# Patient Record
Sex: MALE | Race: WHITE | NOT HISPANIC OR LATINO | URBAN - METROPOLITAN AREA
[De-identification: names, ages, dates, MRNs, and addresses within clinical notes are randomized per-mention and may not be internally consistent; named-entity substitution may affect disease eponyms.]

---

## 2017-02-03 ENCOUNTER — COMMUNICATION - HEALTHEAST (OUTPATIENT)
Dept: INTENSIVE CARE | Facility: CLINIC | Age: 52
End: 2017-02-03

## 2017-02-03 DIAGNOSIS — I48.91 A-FIB (H): ICD-10-CM

## 2017-03-08 ENCOUNTER — COMMUNICATION - HEALTHEAST (OUTPATIENT)
Dept: ADMINISTRATIVE | Facility: CLINIC | Age: 52
End: 2017-03-08

## 2017-04-19 ENCOUNTER — OFFICE VISIT - HEALTHEAST (OUTPATIENT)
Dept: CARDIOLOGY | Facility: CLINIC | Age: 52
End: 2017-04-19

## 2017-04-19 DIAGNOSIS — I10 ESSENTIAL HYPERTENSION WITH GOAL BLOOD PRESSURE LESS THAN 130/80: ICD-10-CM

## 2017-04-19 DIAGNOSIS — Z86.79 STATUS POST ABLATION OF ATRIAL FIBRILLATION: ICD-10-CM

## 2017-04-19 DIAGNOSIS — I48.0 PAROXYSMAL ATRIAL FIBRILLATION (H): ICD-10-CM

## 2017-04-19 DIAGNOSIS — Z98.890 STATUS POST ABLATION OF ATRIAL FIBRILLATION: ICD-10-CM

## 2017-04-19 ASSESSMENT — MIFFLIN-ST. JEOR: SCORE: 2032.62

## 2017-08-04 ENCOUNTER — COMMUNICATION - HEALTHEAST (OUTPATIENT)
Dept: INTENSIVE CARE | Facility: CLINIC | Age: 52
End: 2017-08-04

## 2017-08-04 DIAGNOSIS — I48.91 A-FIB (H): ICD-10-CM

## 2017-08-06 ENCOUNTER — COMMUNICATION - HEALTHEAST (OUTPATIENT)
Dept: CARDIOLOGY | Facility: CLINIC | Age: 52
End: 2017-08-06

## 2017-08-06 DIAGNOSIS — I25.10 CAD (CORONARY ARTERY DISEASE): ICD-10-CM

## 2017-09-21 ENCOUNTER — RECORDS - HEALTHEAST (OUTPATIENT)
Dept: LAB | Facility: CLINIC | Age: 52
End: 2017-09-21

## 2017-09-21 LAB
CHOLEST SERPL-MCNC: 208 MG/DL
FASTING STATUS PATIENT QL REPORTED: ABNORMAL
HDLC SERPL-MCNC: 49 MG/DL
LDLC SERPL CALC-MCNC: 115 MG/DL
PSA SERPL-MCNC: 2 NG/ML (ref 0–3.5)
TRIGL SERPL-MCNC: 220 MG/DL

## 2018-01-28 ENCOUNTER — COMMUNICATION - HEALTHEAST (OUTPATIENT)
Dept: INTENSIVE CARE | Facility: CLINIC | Age: 53
End: 2018-01-28

## 2018-01-28 ENCOUNTER — COMMUNICATION - HEALTHEAST (OUTPATIENT)
Dept: CARDIOLOGY | Facility: CLINIC | Age: 53
End: 2018-01-28

## 2018-01-28 DIAGNOSIS — I48.91 A-FIB (H): ICD-10-CM

## 2018-01-28 DIAGNOSIS — I25.10 CAD (CORONARY ARTERY DISEASE): ICD-10-CM

## 2018-05-15 ENCOUNTER — RECORDS - HEALTHEAST (OUTPATIENT)
Dept: ADMINISTRATIVE | Facility: OTHER | Age: 53
End: 2018-05-15

## 2018-06-11 ENCOUNTER — RECORDS - HEALTHEAST (OUTPATIENT)
Dept: ADMINISTRATIVE | Facility: OTHER | Age: 53
End: 2018-06-11

## 2018-06-13 ENCOUNTER — OFFICE VISIT - HEALTHEAST (OUTPATIENT)
Dept: CARDIOLOGY | Facility: CLINIC | Age: 53
End: 2018-06-13

## 2018-06-13 DIAGNOSIS — I10 ESSENTIAL HYPERTENSION: ICD-10-CM

## 2018-06-13 DIAGNOSIS — Z86.79 STATUS POST ABLATION OF ATRIAL FIBRILLATION: ICD-10-CM

## 2018-06-13 DIAGNOSIS — Z98.890 STATUS POST ABLATION OF ATRIAL FIBRILLATION: ICD-10-CM

## 2018-06-13 DIAGNOSIS — I48.0 PAROXYSMAL ATRIAL FIBRILLATION (H): ICD-10-CM

## 2018-06-13 ASSESSMENT — MIFFLIN-ST. JEOR: SCORE: 2014.93

## 2019-04-17 ENCOUNTER — RECORDS - HEALTHEAST (OUTPATIENT)
Dept: LAB | Facility: CLINIC | Age: 54
End: 2019-04-17

## 2019-04-17 LAB
ANION GAP SERPL CALCULATED.3IONS-SCNC: 8 MMOL/L (ref 5–18)
BUN SERPL-MCNC: 16 MG/DL (ref 8–22)
CALCIUM SERPL-MCNC: 9.8 MG/DL (ref 8.5–10.5)
CHLORIDE BLD-SCNC: 105 MMOL/L (ref 98–107)
CHOLEST SERPL-MCNC: 218 MG/DL
CO2 SERPL-SCNC: 25 MMOL/L (ref 22–31)
CREAT SERPL-MCNC: 0.98 MG/DL (ref 0.7–1.3)
FASTING STATUS PATIENT QL REPORTED: ABNORMAL
GFR SERPL CREATININE-BSD FRML MDRD: >60 ML/MIN/1.73M2
GLUCOSE BLD-MCNC: 98 MG/DL (ref 70–125)
HDLC SERPL-MCNC: 50 MG/DL
LDLC SERPL CALC-MCNC: 146 MG/DL
POTASSIUM BLD-SCNC: 4.6 MMOL/L (ref 3.5–5)
PSA SERPL-MCNC: 1.7 NG/ML (ref 0–3.5)
SODIUM SERPL-SCNC: 138 MMOL/L (ref 136–145)
TRIGL SERPL-MCNC: 108 MG/DL

## 2019-07-14 ENCOUNTER — COMMUNICATION - HEALTHEAST (OUTPATIENT)
Dept: CARDIOLOGY | Facility: CLINIC | Age: 54
End: 2019-07-14

## 2019-07-14 DIAGNOSIS — I10 ESSENTIAL HYPERTENSION: ICD-10-CM

## 2019-07-14 DIAGNOSIS — I48.0 PAROXYSMAL ATRIAL FIBRILLATION (H): ICD-10-CM

## 2019-08-23 ENCOUNTER — OFFICE VISIT - HEALTHEAST (OUTPATIENT)
Dept: CARDIOLOGY | Facility: CLINIC | Age: 54
End: 2019-08-23

## 2019-08-23 DIAGNOSIS — Z98.890 STATUS POST ABLATION OF ATRIAL FIBRILLATION: ICD-10-CM

## 2019-08-23 DIAGNOSIS — I10 ESSENTIAL HYPERTENSION: ICD-10-CM

## 2019-08-23 DIAGNOSIS — Z86.79 STATUS POST ABLATION OF ATRIAL FIBRILLATION: ICD-10-CM

## 2019-08-23 DIAGNOSIS — I48.0 PAROXYSMAL ATRIAL FIBRILLATION (H): ICD-10-CM

## 2019-08-23 ASSESSMENT — MIFFLIN-ST. JEOR: SCORE: 2005.86

## 2020-04-06 ENCOUNTER — COMMUNICATION - HEALTHEAST (OUTPATIENT)
Dept: CARDIOLOGY | Facility: CLINIC | Age: 55
End: 2020-04-06

## 2020-06-04 ENCOUNTER — RECORDS - HEALTHEAST (OUTPATIENT)
Dept: LAB | Facility: CLINIC | Age: 55
End: 2020-06-04

## 2020-06-09 LAB — B BURGDOR IGG+IGM SER QL: 0.2 INDEX VALUE

## 2020-06-10 LAB
B MICROTI DNA BLD QL NAA+PROBE: NOT DETECTED
BABESIA DNA BLD QL NAA+PROBE: NOT DETECTED

## 2020-06-11 LAB
A PHAGOCYTOPH DNA BLD QL NAA+PROBE: NOT DETECTED
E CHAFFEENSIS DNA BLD QL NAA+PROBE: NOT DETECTED
E EWINGII DNA SPEC QL NAA+PROBE: NOT DETECTED
EHRLICHIA DNA SPEC QL NAA+PROBE: NOT DETECTED

## 2020-09-01 ENCOUNTER — RECORDS - HEALTHEAST (OUTPATIENT)
Dept: LAB | Facility: CLINIC | Age: 55
End: 2020-09-01

## 2020-09-01 LAB
ALBUMIN SERPL-MCNC: 4.2 G/DL (ref 3.5–5)
ALP SERPL-CCNC: 70 U/L (ref 45–120)
ALT SERPL W P-5'-P-CCNC: 20 U/L (ref 0–45)
ANION GAP SERPL CALCULATED.3IONS-SCNC: 9 MMOL/L (ref 5–18)
AST SERPL W P-5'-P-CCNC: 14 U/L (ref 0–40)
BILIRUB SERPL-MCNC: 0.6 MG/DL (ref 0–1)
BUN SERPL-MCNC: 9 MG/DL (ref 8–22)
CALCIUM SERPL-MCNC: 9.2 MG/DL (ref 8.5–10.5)
CHLORIDE BLD-SCNC: 104 MMOL/L (ref 98–107)
CHOLEST SERPL-MCNC: 211 MG/DL
CO2 SERPL-SCNC: 26 MMOL/L (ref 22–31)
CREAT SERPL-MCNC: 0.88 MG/DL (ref 0.7–1.3)
FASTING STATUS PATIENT QL REPORTED: ABNORMAL
GFR SERPL CREATININE-BSD FRML MDRD: >60 ML/MIN/1.73M2
GLUCOSE BLD-MCNC: 97 MG/DL (ref 70–125)
HDLC SERPL-MCNC: 50 MG/DL
LDLC SERPL CALC-MCNC: 84 MG/DL
POTASSIUM BLD-SCNC: 4.6 MMOL/L (ref 3.5–5)
PROT SERPL-MCNC: 7.1 G/DL (ref 6–8)
PSA SERPL-MCNC: 1.7 NG/ML (ref 0–3.5)
SODIUM SERPL-SCNC: 139 MMOL/L (ref 136–145)
TRIGL SERPL-MCNC: 386 MG/DL

## 2020-09-24 ENCOUNTER — COMMUNICATION - HEALTHEAST (OUTPATIENT)
Dept: CARDIOLOGY | Facility: CLINIC | Age: 55
End: 2020-09-24

## 2020-09-24 ENCOUNTER — RECORDS - HEALTHEAST (OUTPATIENT)
Dept: ADMINISTRATIVE | Facility: OTHER | Age: 55
End: 2020-09-24

## 2020-09-24 ENCOUNTER — AMBULATORY - HEALTHEAST (OUTPATIENT)
Dept: CARDIOLOGY | Facility: CLINIC | Age: 55
End: 2020-09-24

## 2020-09-24 DIAGNOSIS — I48.0 PAROXYSMAL ATRIAL FIBRILLATION (H): ICD-10-CM

## 2020-09-24 DIAGNOSIS — I10 ESSENTIAL HYPERTENSION: ICD-10-CM

## 2020-09-29 ENCOUNTER — OFFICE VISIT - HEALTHEAST (OUTPATIENT)
Dept: CARDIOLOGY | Facility: CLINIC | Age: 55
End: 2020-09-29

## 2020-09-29 DIAGNOSIS — I10 ESSENTIAL HYPERTENSION: ICD-10-CM

## 2020-09-29 DIAGNOSIS — Z86.79 STATUS POST ABLATION OF ATRIAL FIBRILLATION: ICD-10-CM

## 2020-09-29 DIAGNOSIS — I48.0 PAROXYSMAL ATRIAL FIBRILLATION (H): ICD-10-CM

## 2020-09-29 DIAGNOSIS — Z98.890 STATUS POST ABLATION OF ATRIAL FIBRILLATION: ICD-10-CM

## 2020-09-29 ASSESSMENT — MIFFLIN-ST. JEOR: SCORE: 1996.07

## 2020-12-22 ENCOUNTER — COMMUNICATION - HEALTHEAST (OUTPATIENT)
Dept: CARDIOLOGY | Facility: CLINIC | Age: 55
End: 2020-12-22

## 2020-12-22 DIAGNOSIS — I48.0 PAROXYSMAL ATRIAL FIBRILLATION (H): ICD-10-CM

## 2020-12-22 DIAGNOSIS — I10 ESSENTIAL HYPERTENSION: ICD-10-CM

## 2020-12-22 RX ORDER — LISINOPRIL 5 MG/1
TABLET ORAL
Qty: 90 TABLET | Refills: 3 | Status: SHIPPED | OUTPATIENT
Start: 2020-12-22 | End: 2022-01-24

## 2020-12-22 RX ORDER — METOPROLOL SUCCINATE 50 MG/1
TABLET, EXTENDED RELEASE ORAL
Qty: 90 TABLET | Refills: 3 | Status: SHIPPED | OUTPATIENT
Start: 2020-12-22 | End: 2022-01-24

## 2021-05-30 VITALS — HEIGHT: 71 IN | BODY MASS INDEX: 36.52 KG/M2 | WEIGHT: 260.9 LBS

## 2021-05-31 ENCOUNTER — RECORDS - HEALTHEAST (OUTPATIENT)
Dept: ADMINISTRATIVE | Facility: CLINIC | Age: 56
End: 2021-05-31

## 2021-05-31 NOTE — PATIENT INSTRUCTIONS - HE
Jose Antonio Arce,    It was a pleasure to see you today at the [facility].     My recommendations after this visit include:    Continue current medications    Follow up in 1 year, or sooner if needed    Mariama Rico, [facility], Electrophysiology  [phone]  EP nurses [phone]

## 2021-06-01 VITALS — BODY MASS INDEX: 35.98 KG/M2 | HEIGHT: 71 IN | WEIGHT: 257 LBS

## 2021-06-03 VITALS — BODY MASS INDEX: 35.7 KG/M2 | HEIGHT: 71 IN | WEIGHT: 255 LBS

## 2021-06-04 VITALS
BODY MASS INDEX: 35.39 KG/M2 | WEIGHT: 252.8 LBS | RESPIRATION RATE: 12 BRPM | SYSTOLIC BLOOD PRESSURE: 132 MMHG | DIASTOLIC BLOOD PRESSURE: 80 MMHG | HEIGHT: 71 IN | HEART RATE: 68 BPM

## 2021-06-07 NOTE — TELEPHONE ENCOUNTER
Left detailed voicemail with Chloes recommendations.  Left our contact information for questions/concerns.  JW

## 2021-06-07 NOTE — TELEPHONE ENCOUNTER
Likely stress related increase in PACs.  Stay well hydrated, limit caffeine and alcohol.  Call if symptoms worsen or he has recurrence of A fib.  Thanks,  Mariama

## 2021-06-07 NOTE — TELEPHONE ENCOUNTER
Mariama,     Patient was last seen in August of last year for afib with no recurrence.     He is calling today with symptoms of increase palpitations, describes sensation of 4 skipping beats an hour.    Denies atrial fibrillation.    He feels he is well hydrated but admits to extra stress at his job.     Describes no changes to medications and no illness.     These palpitations are tolerable but cause some mild shortness of breath and chest heaviness and he noticed them getting worse last Thursday.    Recommendations?  Thanks  Ria

## 2021-06-07 NOTE — TELEPHONE ENCOUNTER
----- Message from Thi Duval sent at 4/6/2020  8:15 AM CDT -----  General phone call:    Caller: Patient  Primary cardiologist: Mariama  Detailed reason for call: Patient has been experiencing a lot of more palps than normal for him   New or active symptoms? Palps  Best phone number: [phone]  Best time to contact: Anytime  Ok to leave a detailed message? Yes  Device? no    Additional Info:

## 2021-06-10 NOTE — PROGRESS NOTES
[facility]    Assessment/Plan:      Problem List Items Addressed This Visit     Essential hypertension - Primary    Paroxysmal Atrial Fibrillation    Status post ablation of atrial fibrillation        1.  Symptomatic Paroxysmal atrial fibrillation: He has had no symptomatology or evidence recurrence of arrhythmia off antiarrhythmic therapy.  His occasional palpitations are consistent with PACs and ectopic atrial tachycardia, both of which a known history.  We reviewed avoidance of possible triggers.  Continue metoprolol succinate 50 mg daily.  He has a [id number] score of 1 for hypertension; continue aspirin 81 mg daily.     2.  Hypertension: Blood pressure at target today.    Follow-up with Dr. Rose in 1 year.    Subjective:      Jose Antonio Arce, a very pleasant 51-year-old gentleman, comes in today accompanied by his wife for follow-up of atrial fibrillation.  He was initially diagnosed with atrial fibrillation in July 2013 and underwent KIERA guided cardioversion.  He was maintained on aspirin and metoprolol and had no symptomatology of recurrence until April 2015 and underwent early cardioversion on 4/14/15 after which he was started on Eliquis 5 mg twice a day.  Symptoms consist of tachycardia palpitations, chest tightness, lightheadedness, and dyspnea on exertion.  He underwent pulmonary vein isolation ablation on 10/27/15 by Dr. Rose with cryo-to all 3 pulmonary veins (he has a left common pulmonary vein).   He had a few very brief episodes of atrial fibrillation about a month after ablation, one of which was documented on an event monitor.  He had no further recurrence so his Eliquis was discontinued and he was restarted on aspirin 81 mg daily.    He has been feeling very well.  He continues to have occasional skips, but no sustained arrhythmia.  He denies fatigue, chest discomfort, abdominal bloating/fullness or peripheral edema, shortness of breath, paroxysmal nocturnal dyspnea, orthopnea,  lightheadedness, dizziness, pre-syncope, or syncope.       Medical, surgical, family, social history, and medications were reviewed and updated as necessary.  He reports no previous adverse reactions to anesthesia, or bleeding problems.    Patient Active Problem List   Diagnosis     Essential hypertension     Paroxysmal Atrial Fibrillation     Status post ablation of atrial fibrillation       Past Medical History:   Diagnosis Date     Cardiomyopathy      Chest tightness      Hypertension      Paroxysmal atrial fibrillation     s/p ablation     SOB (shortness of breath)        Past Surgical History:   Procedure Laterality Date     CARDIAC ELECTROPHYSIOLOGY MAPPING AND ABLATION  10/27/15    Cryo to all 3 PV     MANDIBLE SURGERY  1986    broken jaw     [place] CARDIOVERSION ELECTIVE ARRHYTHMIA EXTERNAL      July 2013, 4/14/15       No Known Allergies    Current Outpatient Prescriptions   Medication Sig Dispense Refill     aspirin 81 MG EC tablet Take 81 mg by mouth daily.       DOCOSAHEXANOIC ACID/EPA (FISH OIL ORAL) Take 1 capsule by mouth daily.       lisinopril (PRINIVIL,ZESTRIL) 5 MG tablet TAKE 1 TABLET BY MOUTH DAILY 90 tablet 2     metoprolol succinate (TOPROL-XL) 50 MG 24 hr tablet TAKE 1 TABLET BY MOUTH EVERY DAY 90 tablet 1     multivitamin therapeutic (THERAGRAN) tablet Take 1 tablet by mouth daily.       No current facility-administered medications for this visit.        Family History   Problem Relation Age of Onset     Gout Mother      Heart failure Mother      Ulcers Father      Hypertension Sister      Diabetes type II Sister      Hypertension Sister      Breast cancer Sister        Social History   Substance Use Topics     Smoking status: Never Smoker     Smokeless tobacco: Never Used     Alcohol use 1.2 oz/week     1 Cans of beer, 1 Shots of liquor per week       Review of Systems:   General: WNL  Eyes: WNL  Ears/Nose/Throat: WNL  Lungs: WNL  Heart:  (palpitations)  Stomach: WNL  Bladder: Frequent  "Urination at Night  Muscle/Joints: WNL  Skin: WNL  Nervous System: Daytime Sleepiness  Mental Health: WNL     Blood: Easy Bleeding      Objective:      /70 (Patient Site: Left Arm, Patient Position: Sitting, Cuff Size: Adult Large)  Pulse 72  Resp 18  Ht 5' 10.5\" (1.791 m)  Wt (!) 260 lb 14.4 oz (118.3 kg)  BMI 36.91 kg/m2    Physical Exam  General Appearance:  Alert, well-appearing, in no acute distress.     HEENT:  Atraumatic, normocephalic; no scleral icterus, EOM intact; the mucous membranes were pink and moist.   Chest:  Chest symmetric, spine straight.    Lungs:   Respirations unlabored; the lungs are clear to auscultation.   Cardiovascular:   Auscultation reveals normal first and second heart sounds with no murmurs, rubs, or gallops.  Regular rate and rhythm.  Radial and posterior tibial pulses are intact.    Abdomen:  Soft, nontender, nondistended, bowel sounds present.     Extremities: No cyanosis, clubbing, or edema.   Musculoskeletal:  Moves all extremities.     Skin: No xanthelasma.   Neurologic: Mood and affect are appropriate. Oriented to person, place, time, and situation.       Cardiographics  EC16 was personally reviewed shows normal sinus rhythm at 72 bpm    2 week event monitor worn 12/7/15 through 12/21/15 showed a single symptomatic episode of paroxysmal atrial fibrillation with elevated ventricular response at 135 bpm, occasional PACs, atrial couplets, and a short run of ectopic atrial tachycardia, all of which were noted on manual transmissions.    MR cardiac pulmonary vein done 10/5/15  Left ventricular size, thickness, function, and wall motion are all normal.  EF is 52%.  There is no evidence of myocardial scar.  Mildly enlarged left atrium.  No obvious valvular disease.  Left Common Os: 23.7 x 10.8 mm, area 1.98 sq cm.   Left Superior Pulmonary Vein: 10.9 x 12 mm, area 1.05 sq cm.   Left Inferior Pulmonary Vein: 13.9 x 13.2 mm, area 1.65 sq cm.   Right Superior Pulmonary " Vein: 28.8 x 22.1 mm, area 5.25 sq cm.   Right Inferior Pulmonary Vein: 16.7 x 16 mm, area 2.06 sq cm.   Esophagus: The esophagus is adjacent to the posterior side of left atrium body.    Echo stress exercise done 12/6/2016    Average exercise tolerance    The stress electrocardiogram is negative for inducible ischemia.    Negative stress echocardiogram    Screening Doppler shows no hemodynamically significant valvular heart abnormalities      Lab results  Lab Results   Component Value Date    CREATININE 0.86 08/17/2016    BUN 16 08/17/2016     08/17/2016    K 4.1 08/17/2016     08/17/2016    CO2 22 08/17/2016     Lab Results   Component Value Date    TSH 2.36 08/17/2016         Mariama Rico, [facility], Electrophysiology  [phone]    This note has been dictated using voice recognition software. Any grammatical, typographical, or context distortions are unintentional and inherent to the software.

## 2021-06-11 NOTE — PATIENT INSTRUCTIONS - HE
Jose Antonio Arce,    It was a pleasure to see you today at the [facility].     My recommendations after this visit include:    Continue current medications.    Follow up in 1 year.    Mariama Rico CNP  [facility], Electrophysiology  [phone]  EP nurses [phone]

## 2021-06-26 NOTE — PROGRESS NOTES
Progress Notes by Mariama Rico CNP at 6/13/2018  3:30 PM     Author: Mariama Rico CNP Service: -- Author Type: Nurse Practitioner    Filed: 6/13/2018  4:17 PM Encounter Date: 6/13/2018 Status: Signed    : Mariama Rico CNP (Nurse Practitioner)           Click to link to [facility]     [facility] HEART [facility] ELECTROPHYSIOLOGY NOTE      Assessment/Recommendations   Assessment/Plan:    1.  Paroxysmal atrial fibrillation: No symptomatology or evidence of recurrence of A. fib off antiarrhythmic therapy.  Continue metoprolol succinate 50 mg daily.  He was instructed to call if he has recurrence of A. fib.  He has a [id number] score of 1 for hypertension; continue aspirin 81 mg daily.    2.  Hypertension: Blood pressure at target today.  Continue metoprolol and lisinopril.    Follow up in 1 year.     History of Present Illness    Mr. Jose Antonio Arce is a very pleasant 53 y.o. male who comes in today for EP follow-up of atrial fibrillation.  He was diagnosed with atrial fibrillation in July 2013 for which he underwent KIERA guided cardioversion.  Symptoms consist of tachypalpitations, chest tightness, lightheadedness, and dyspnea on exertion.  He had recurrence in April 2015, undergoing early cardioversion after which he was started on Eliquis for stroke prophylaxis.  He underwent pulmonary vein isolation ablation by Dr. Rose on 10/27/2015 with cryo-to all 3 pulmonary veins (he has a left common pulmonary vein).  He had a couple of very brief episodes of A. fib the month after ablation, 1 of which was documented on an event monitor, but no subsequent recurrence.    He states that he continues to feel very well, the best he has in years.  He has been very active and states he has not even had any skips or brief palpitations.  He denies chest discomfort, abdominal fullness/bloating or peripheral edema, shortness of breath, paroxysmal nocturnal dyspnea, orthopnea, lightheadedness, dizziness, pre-syncope,  or syncope.    Cardiographics (personally reviewed):  EKG, Done 4/14/2015 shows atrial fibrillation with rapid ventricular response at 157 bpm    Stress echo done 12/6/2016:    Average exercise tolerance    The stress electrocardiogram is negative for inducible ischemia.    Negative stress echocardiogram    Screening Doppler shows no hemodynamically significant valvular heart abnormalities       Physical Examination Review of Systems   Vitals:    06/13/18 1535   BP: 132/68   Pulse: 72   Resp: 16     Body mass index is 36.35 kg/(m^2).  Wt Readings from Last 3 Encounters:   06/13/18 (!) 257 lb (116.6 kg)   04/19/17 (!) 260 lb 14.4 oz (118.3 kg)   11/29/16 (!) 260 lb (117.9 kg)     General Appearance:   Alert, well-appearing and in no acute distress.   HEENT: Atraumatic, normocephalic.  No scleral icterus, normal conjunctivae, EOM intact, PERRL; mucous membranes pink and moist.    Chest: Chest symmetric, spine straight.   Lungs:   Respirations unlabored: Lungs are clear to auscultation.   Cardiovascular:   Normal first and second heart sounds with no murmurs, rubs, or gallops.  Regular rate and rhythm.  Radial and posterior tibial pulses are intact, no edema.   Abdomen:  Nondistended, bowel sounds present   Extremities: No cyanosis or clubbing   Musculoskeletal: Moves all extremities   Skin: Warm, dry, intact.    Neurologic: Mood and affect are appropriate, alert and oriented to person, place, time, and situation    General: WNL  Eyes: WNL  Ears/Nose/Throat: WNL  Lungs: WNL  Heart: WNL  Stomach: WNL  Bladder: WNL  Muscle/Joints: WNL  Skin: WNL  Nervous System: WNL  Mental Health: WNL     Blood: WNL     Medical History  Surgical History Family History Social History   Past Medical History:   Diagnosis Date   ? Cardiomyopathy (H)    ? Chest tightness    ? Hypertension    ? Paroxysmal atrial fibrillation (H)     s/p ablation   ? SOB (shortness of breath)     Past Surgical History:   Procedure Laterality Date   ? CARDIAC  ELECTROPHYSIOLOGY MAPPING AND ABLATION  10/27/15    Cryo to all 3 PV   ? MANDIBLE SURGERY  1986    broken jaw   ? [place] CARDIOVERSION ELECTIVE ARRHYTHMIA EXTERNAL      July 2013, 4/14/15    Family History   Problem Relation Age of Onset   ? Gout Mother    ? Heart failure Mother    ? Ulcers Father    ? Hypertension Sister    ? Diabetes type II Sister    ? Hypertension Sister    ? Breast cancer Sister     Social History     Social History   ? Marital status:      Spouse name: N/A   ? Number of children: N/A   ? Years of education: N/A     Occupational History   ? Not on file.     Social History Main Topics   ? Smoking status: Never Smoker   ? Smokeless tobacco: Never Used   ? Alcohol use 1.2 oz/week     1 Cans of beer, 1 Shots of liquor per week   ? Drug use: No   ? Sexual activity: Not on file     Other Topics Concern   ? Not on file     Social History Narrative          Medications  Allergies   Current Outpatient Prescriptions   Medication Sig Dispense Refill   ? aspirin 81 MG EC tablet Take 81 mg by mouth daily.     ? DOCOSAHEXANOIC ACID/EPA (FISH OIL ORAL) Take 1 capsule by mouth daily.     ? lisinopril (PRINIVIL,ZESTRIL) 5 MG tablet Take 1 tablet (5 mg total) by mouth daily. 90 tablet 3   ? metoprolol succinate (TOPROL-XL) 50 MG 24 hr tablet Take 1 tablet (50 mg total) by mouth daily. 90 tablet 3   ? multivitamin therapeutic (THERAGRAN) tablet Take 1 tablet by mouth daily.       No current facility-administered medications for this visit.       No Known Allergies   Medical, surgical, family, social history, and medications were all reviewed and updated as necessary.   Lab Results    Chemistry CBC Other   Lab Results   Component Value Date    CREATININE 0.90 09/21/2017    BUN 14 09/21/2017     09/21/2017    K 4.1 09/21/2017     09/21/2017    CO2 24 09/21/2017     Creatinine (mg/dL)   Date Value   09/21/2017 0.90   08/17/2016 0.86   10/21/2015 0.88   07/21/2013 0.92    Lab Results   Component Value  Date    WBC 6.8 10/21/2015    HGB 13.9 (L) 10/21/2015    HCT 40.4 10/21/2015    MCV 94 10/21/2015     10/21/2015    Lab Results   Component Value Date    INR 1.02 07/20/2013     Lab Results   Component Value Date    TSH 2.36 08/17/2016                This note has been dictated using voice recognition software. Any grammatical, typographical, or context distortions are unintentional and inherent to the software.    Mariama Rico, [facility]   Electrophysiology

## 2021-06-27 NOTE — PROGRESS NOTES
Progress Notes by Mariama Rico CNP at 8/23/2019  9:50 AM     Author: Mariama Rico CNP Service: -- Author Type: Nurse Practitioner    Filed: 8/23/2019 10:12 AM Encounter Date: 8/23/2019 Status: Signed    : Mariama Rico CNP (Nurse Practitioner)           Click to link to [facility] Heart [facility] HEART [facility] ELECTROPHYSIOLOGY NOTE      Assessment/Recommendations   Assessment/Plan:    1.  Paroxysmal atrial fibrillation: No symptomatology or evidence of recurrence of A. Fib.  He remains off membrane active antiarrhythmic drug therapy.  He was instructed to call if he has recurrence of A. fib.  He has a [id number] score of 1 for hypertension; continue aspirin 81 mg daily.    2.  Hypertension: Blood pressure at target today.  Continue metoprolol succinate 50 mg daily and lisinopril 5 mg daily.  Recent labs reviewed.  Refills sent today.    Follow up in 1 year.     History of Present Illness    Mr. Jose Antonio Arce is a very pleasant 54 y.o. male who comes in today accompanied by his wife for EP follow-up of atrial fibrillation.  He was diagnosed with atrial fibrillation in July 2013 for which he underwent KIERA guided cardioversion.  Symptoms consist of tachypalpitations, chest tightness, lightheadedness, and dyspnea on exertion.  He had recurrence in April 2015, undergoing early cardioversion after which he was started on Eliquis for stroke prophylaxis.  He underwent pulmonary vein isolation ablation by Dr. Rose on 10/27/2015 with cryo-to all 3 pulmonary veins (he has a left common pulmonary vein).  He had a couple of very brief episodes of A. fib the month after ablation, 1 of which was documented on an event monitor, but no subsequent recurrence.  Eliquis was discontinued and he remains on low dose daily aspirin for stroke prophylaxis.    He states that he has been feeling very well and remains active.  He has not had any episodes of arrhythmia or even skipped beats.  He denies chest discomfort,  palpitations, abdominal fullness/bloating or peripheral edema, shortness of breath, paroxysmal nocturnal dyspnea, orthopnea, lightheadedness, dizziness, pre-syncope, or syncope.  He and his wife are in the final stages of building a new house up [place], though do not plan to move until next year.    Cardiographics (personally reviewed):  EKG done 2/2/2016 shows sinus rhythm at 72 bpm, QT/QTc interval measures 352/385 ms  EKG, Done 4/14/2015 shows atrial fibrillation with rapid ventricular response at 157 bpm    Event monitor worn 2/7/2015 through 12/21/2015 shows sinus rhythm with a single episode of paroxysmal atrial fibrillation with rapid ventricular response at 135 bpm.  Occasional PACs and a short burst of ectopic atrial tachycardia.    Stress echo done 12/6/2016:    Average exercise tolerance    The stress electrocardiogram is negative for inducible ischemia.    Negative stress echocardiogram    Screening Doppler shows no hemodynamically significant valvular heart abnormalities       Physical Examination Review of Systems   Vitals:    08/23/19 0937   BP: 120/74   Pulse: 70   Resp: 16     Body mass index is 36.07 kg/m .  Wt Readings from Last 3 Encounters:   08/23/19 (!) 255 lb (115.7 kg)   06/13/18 (!) 257 lb (116.6 kg)   04/19/17 (!) 260 lb 14.4 oz (118.3 kg)     General Appearance:   Alert, well-appearing and in no acute distress.   HEENT: Atraumatic, normocephalic.  No scleral icterus, normal conjunctivae, EOM intact, PERRL; mucous membranes pink and moist.    Chest: Chest symmetric, spine straight.   Lungs:   Respirations unlabored: Lungs are clear to auscultation.   Cardiovascular:   Normal first and second heart sounds with no murmurs, rubs, or gallops.  Regular rate and rhythm.  Radial and posterior tibial pulses are intact, no edema.   Abdomen:  Soft, nondistended, bowel sounds present   Extremities: No cyanosis or clubbing   Musculoskeletal: Moves all extremities   Skin: Warm, dry, intact.     Neurologic: Mood and affect are appropriate, alert and oriented to person, place, time, and situation    General: WNL  Eyes: WNL  Ears/Nose/Throat: WNL  Lungs: WNL  Heart: WNL  Stomach: WNL  Bladder: WNL  Muscle/Joints: WNL  Skin: WNL  Nervous System: WNL  Mental Health: WNL     Blood: WNL     Medical History  Surgical History Family History Social History   Past Medical History:   Diagnosis Date   ? Cardiomyopathy (H)    ? Chest tightness    ? Hypertension    ? Paroxysmal atrial fibrillation (H)     s/p ablation   ? SOB (shortness of breath)     Past Surgical History:   Procedure Laterality Date   ? CARDIAC ELECTROPHYSIOLOGY MAPPING AND ABLATION  10/27/15    Cryo to all 3 PV   ? MANDIBLE SURGERY  1986    broken jaw   ? [place] CARDIOVERSION ELECTIVE ARRHYTHMIA EXTERNAL      July 2013, 4/14/15    Family History   Problem Relation Age of Onset   ? Gout Mother    ? Heart failure Mother    ? Ulcers Father    ? Hypertension Sister    ? Diabetes type II Sister    ? Hypertension Sister    ? Breast cancer Sister     Social History     Tobacco Use   ? Smoking status: Never Smoker   ? Smokeless tobacco: Never Used   Substance Use Topics   ? Alcohol use: Yes     Alcohol/week: 1.2 oz     Types: 1 Cans of beer, 1 Shots of liquor per week   ? Drug use: No          Medications  Allergies   Current Outpatient Medications   Medication Sig Dispense Refill   ? aspirin 81 MG EC tablet Take 81 mg by mouth daily.     ? DOCOSAHEXANOIC ACID/EPA (FISH OIL ORAL) Take 1 capsule by mouth daily.     ? lisinopril (PRINIVIL,ZESTRIL) 5 MG tablet Take 1 tablet (5 mg total) by mouth daily. 90 tablet 3   ? metoprolol succinate (TOPROL-XL) 50 MG 24 hr tablet Take 1 tablet (50 mg total) by mouth daily. 90 tablet 3   ? multivitamin therapeutic (THERAGRAN) tablet Take 1 tablet by mouth daily.       No current facility-administered medications for this visit.       No Known Allergies   Medical, surgical, family, social history, and medications were all  reviewed and updated as necessary.   Lab Results    Chemistry CBC Other   Lab Results   Component Value Date    CREATININE 0.98 04/17/2019    BUN 16 04/17/2019     04/17/2019    K 4.6 04/17/2019     04/17/2019    CO2 25 04/17/2019     Creatinine (mg/dL)   Date Value   04/17/2019 0.98   09/21/2017 0.90   08/17/2016 0.86   10/21/2015 0.88    Lab Results   Component Value Date    WBC 6.8 10/21/2015    HGB 13.9 (L) 10/21/2015    HCT 40.4 10/21/2015    MCV 94 10/21/2015     10/21/2015    Lab Results   Component Value Date    INR 1.02 07/20/2013     Lab Results   Component Value Date    TSH 2.36 08/17/2016                This note has been dictated using voice recognition software. Any grammatical, typographical, or context distortions are unintentional and inherent to the software.    Mariama Rico, [facility]   Electrophysiology

## 2021-06-29 NOTE — PROGRESS NOTES
Progress Notes by Mariama Rico CNP at 9/29/2020  3:30 PM     Author: Mariama Rico CNP Service: -- Author Type: Nurse Practitioner    Filed: 9/29/2020  4:17 PM Encounter Date: 9/29/2020 Status: Signed    : Mariama Rico CNP (Nurse Practitioner)             Assessment/Recommendations   Assessment/Plan:    1.  Paroxysmal atrial fibrillation: No symptomatology or evidence of recurrence of A. Fib.  He remains off membrane active antiarrhythmic drug therapy.  He was instructed to call if he has recurrence of A. fib.  He has a [id number] score of 1 for hypertension; continue aspirin 81 mg daily.     2.  Hypertension: Blood pressure at target today.  Continue metoprolol succinate 50 mg daily and lisinopril 5 mg daily.  Recent labs reviewed.  Refills sent today.    Follow up in 1 year     History of Present Illness/Subjective    Mr. Jose Antonio Arce is a 55 y.o. male who comes in today for EP follow-up of atrial fibrillation.  He has a history of atrial fibrillation and hypertension.  He was diagnosed with atrial fibrillation in July 2013 for which he underwent KIERA guided cardioversion.  Symptoms consist of tachypalpitations, chest tightness, lightheadedness, and dyspnea on exertion.  He had recurrence in April 2015, undergoing early cardioversion after which he was started on Eliquis for stroke prophylaxis.  He underwent pulmonary vein isolation ablation by Dr. Rose on 10/27/2015 with cryo-to all 3 pulmonary veins (he has a left common pulmonary vein).  He had a couple of very brief episodes of A. fib the month after ablation, 1 of which was documented on an event monitor, but no subsequent recurrence.  Eliquis was discontinued and he remains on low dose daily aspirin for stroke prophylaxis.     He states that he continues to feel well and remains active.  He has not had any episodes of A fib.  He denies chest discomfort, palpitations, abdominal fullness/bloating or peripheral edema, shortness of breath,  paroxysmal nocturnal dyspnea, orthopnea, lightheadedness, dizziness, pre-syncope, or syncope.  His wife has moved to the [facility] in [place], he will be joining her in a month.     Cardiographics (personally reviewed):  EKG done 2/2/2016 shows sinus rhythm at 72 bpm, QT/QTc interval measures 352/385 ms  EKG, Done 4/14/2015 shows atrial fibrillation with rapid ventricular response at 157 bpm     Event monitor worn 2/7/2015 through 12/21/2015 shows sinus rhythm with a single episode of paroxysmal atrial fibrillation with rapid ventricular response at 135 bpm.  Occasional PACs and a short burst of ectopic atrial tachycardia.     Stress echo done 12/6/2016:    Average exercise tolerance    The stress electrocardiogram is negative for inducible ischemia.    Negative stress echocardiogram    Screening Doppler shows no hemodynamically significant valvular heart abnormalities    I have reviewed and updated the patient's Past Medical History, Social History, Family History and Medication List.     Physical Examination Review of Systems   Vitals:    09/29/20 1558   BP: 132/80   Pulse: 68   Resp: 12     Body mass index is 35.75 kg/m .  Wt Readings from Last 3 Encounters:   09/29/20 (!) 252 lb 12.8 oz (114.7 kg)   08/23/19 (!) 255 lb (115.7 kg)   06/13/18 (!) 257 lb (116.6 kg)       General Appearance:   Alert, well-appearing and in no acute distress.   HEENT: Atraumatic, normocephalic.  No scleral icterus, normal conjunctivae, EOMs intact, PERRL.     Chest/Lungs:   Chest symmetric, spine straight.  Respirations unlabored.  Lungs are clear to auscultation.   Cardiovascular:   Regular rate and rhythm.  Normal first and second heart sounds with no murmurs, rubs, or gallops; radial and posterior tibial pulses are intact, No edema.   Abdomen:  Soft, nondistended, bowel sounds present.   Extremities: No cyanosis or clubbing.   Musculoskeletal: Moves all extremities.  Gait steady.   Skin: Warm, dry, intact.    Neurologic: Mood and  affect are appropriate.  Alert and oriented to person, place, time, and situation.    General: WNL  Eyes: WNL  Ears/Nose/Throat: WNL  Lungs: WNL  Heart: WNL  Stomach: WNL  Bladder: WNL  Muscle/Joints: WNL  Skin: WNL  Nervous System: WNL  Mental Health: WNL     Blood: Easy Bleeding       Medical History  Surgical History Family History Social History   Past Medical History:   Diagnosis Date   ? Cardiomyopathy (H)    ? Chest tightness    ? Hypertension    ? Paroxysmal atrial fibrillation (H)     s/p ablation   ? SOB (shortness of breath)     Past Surgical History:   Procedure Laterality Date   ? CARDIAC ELECTROPHYSIOLOGY MAPPING AND ABLATION  10/27/15    Cryo to all 3 PV   ? MANDIBLE SURGERY  1986    broken jaw   ? PA CARDIOVERSION ELECTIVE ARRHYTHMIA EXTERNAL      July 2013, 4/14/15    Family History   Problem Relation Age of Onset   ? Gout Mother    ? Heart failure Mother    ? Ulcers Father    ? Hypertension Sister    ? Diabetes type II Sister    ? Hypertension Sister    ? Breast cancer Sister     Social History     Tobacco Use   ? Smoking status: Never Smoker   ? Smokeless tobacco: Never Used   Substance Use Topics   ? Alcohol use: Yes     Alcohol/week: 2.0 standard drinks     Types: 1 Cans of beer, 1 Shots of liquor per week   ? Drug use: No          Medications  Allergies   Current Outpatient Medications   Medication Sig Dispense Refill   ? aspirin 81 MG EC tablet Take 81 mg by mouth daily.     ? DOCOSAHEXANOIC ACID/EPA (FISH OIL ORAL) Take 1 capsule by mouth daily.     ? lisinopriL (PRINIVIL,ZESTRIL) 5 MG tablet Take 1 tablet (5 mg total) by mouth daily. 90 tablet 3   ? metoprolol succinate (TOPROL-XL) 50 MG 24 hr tablet Take 1 tablet (50 mg total) by mouth daily. 90 tablet 3   ? multivitamin therapeutic (THERAGRAN) tablet Take 1 tablet by mouth daily.       No current facility-administered medications for this visit.     No Known Allergies      Lab Results    Chemistry/lipid CBC Other   Lab Results   Component  Value Date    CREATININE 0.88 09/01/2020    BUN 9 09/01/2020     09/01/2020    K 4.6 09/01/2020     09/01/2020    CO2 26 09/01/2020     Creatinine (mg/dL)   Date Value   09/01/2020 0.88   04/17/2019 0.98   09/21/2017 0.90   08/17/2016 0.86       Lab Results   Component Value Date    CHOL 211 (H) 09/01/2020    HDL 50 09/01/2020    Lab Results   Component Value Date    WBC 6.8 10/21/2015    HGB 13.9 (L) 10/21/2015    HCT 40.4 10/21/2015    MCV 94 10/21/2015     10/21/2015    Lab Results   Component Value Date    TROPONINI 0.03 07/21/2013    TSH 2.36 08/17/2016     Lab Results   Component Value Date    INR 1.02 07/20/2013        This note has been dictated using voice recognition software. Any grammatical, typographical, or context distortions are unintentional and inherent to the software.

## 2021-08-21 ENCOUNTER — HEALTH MAINTENANCE LETTER (OUTPATIENT)
Age: 56
End: 2021-08-21

## 2021-10-16 ENCOUNTER — HEALTH MAINTENANCE LETTER (OUTPATIENT)
Age: 56
End: 2021-10-16

## 2022-01-24 ENCOUNTER — MYC REFILL (OUTPATIENT)
Dept: CARDIOLOGY | Facility: CLINIC | Age: 57
End: 2022-01-24

## 2022-01-24 DIAGNOSIS — I48.0 PAROXYSMAL ATRIAL FIBRILLATION (H): ICD-10-CM

## 2022-01-24 DIAGNOSIS — I10 ESSENTIAL HYPERTENSION: ICD-10-CM

## 2022-01-24 RX ORDER — METOPROLOL SUCCINATE 50 MG/1
50 TABLET, EXTENDED RELEASE ORAL DAILY
Qty: 30 TABLET | Refills: 1 | Status: SHIPPED | OUTPATIENT
Start: 2022-01-24

## 2022-01-24 RX ORDER — LISINOPRIL 5 MG/1
5 TABLET ORAL DAILY
Qty: 30 TABLET | Refills: 1 | Status: SHIPPED | OUTPATIENT
Start: 2022-01-24

## 2022-10-01 ENCOUNTER — HEALTH MAINTENANCE LETTER (OUTPATIENT)
Age: 57
End: 2022-10-01

## 2023-10-15 ENCOUNTER — HEALTH MAINTENANCE LETTER (OUTPATIENT)
Age: 58
End: 2023-10-15